# Patient Record
Sex: FEMALE | Race: WHITE | NOT HISPANIC OR LATINO | Employment: OTHER | URBAN - METROPOLITAN AREA
[De-identification: names, ages, dates, MRNs, and addresses within clinical notes are randomized per-mention and may not be internally consistent; named-entity substitution may affect disease eponyms.]

---

## 2024-06-08 ENCOUNTER — ANESTHESIA (OUTPATIENT)
Dept: OPERATING ROOM | Facility: HOSPITAL | Age: 79
End: 2024-06-08
Payer: MEDICARE

## 2024-06-08 ENCOUNTER — ANESTHESIA EVENT (OUTPATIENT)
Dept: OPERATING ROOM | Facility: HOSPITAL | Age: 79
End: 2024-06-08
Payer: MEDICARE

## 2024-06-08 PROBLEM — I61.9 INTRACEREBRAL HEMORRHAGE, NONTRAUMATIC (MULTI): Status: ACTIVE | Noted: 2024-06-08

## 2024-06-08 PROCEDURE — 2500000004 HC RX 250 GENERAL PHARMACY W/ HCPCS (ALT 636 FOR OP/ED): Performed by: PAIN MEDICINE

## 2024-06-08 PROCEDURE — 2500000005 HC RX 250 GENERAL PHARMACY W/O HCPCS

## 2024-06-08 PROCEDURE — 2500000004 HC RX 250 GENERAL PHARMACY W/ HCPCS (ALT 636 FOR OP/ED): Mod: JZ,JG

## 2024-06-08 PROCEDURE — P9045 ALBUMIN (HUMAN), 5%, 250 ML: HCPCS | Mod: JZ,JG

## 2024-06-08 RX ORDER — ROCURONIUM BROMIDE 10 MG/ML
INJECTION, SOLUTION INTRAVENOUS AS NEEDED
Status: DISCONTINUED | OUTPATIENT
Start: 2024-06-08 | End: 2024-06-08

## 2024-06-08 RX ORDER — PROPOFOL 10 MG/ML
INJECTION, EMULSION INTRAVENOUS AS NEEDED
Status: DISCONTINUED | OUTPATIENT
Start: 2024-06-08 | End: 2024-06-08

## 2024-06-08 RX ORDER — ALBUMIN HUMAN 50 G/1000ML
SOLUTION INTRAVENOUS AS NEEDED
Status: DISCONTINUED | OUTPATIENT
Start: 2024-06-08 | End: 2024-06-08

## 2024-06-08 RX ORDER — SODIUM CHLORIDE, SODIUM LACTATE, POTASSIUM CHLORIDE, CALCIUM CHLORIDE 600; 310; 30; 20 MG/100ML; MG/100ML; MG/100ML; MG/100ML
INJECTION, SOLUTION INTRAVENOUS CONTINUOUS PRN
Status: DISCONTINUED | OUTPATIENT
Start: 2024-06-08 | End: 2024-06-08

## 2024-06-08 RX ORDER — PHENYLEPHRINE HCL IN 0.9% NACL 0.4MG/10ML
SYRINGE (ML) INTRAVENOUS AS NEEDED
Status: DISCONTINUED | OUTPATIENT
Start: 2024-06-08 | End: 2024-06-08

## 2024-06-08 RX ORDER — ESMOLOL HYDROCHLORIDE 10 MG/ML
INJECTION INTRAVENOUS AS NEEDED
Status: DISCONTINUED | OUTPATIENT
Start: 2024-06-08 | End: 2024-06-08

## 2024-06-08 RX ORDER — CEFAZOLIN 1 G/1
INJECTION, POWDER, FOR SOLUTION INTRAVENOUS AS NEEDED
Status: DISCONTINUED | OUTPATIENT
Start: 2024-06-08 | End: 2024-06-08

## 2024-06-08 RX ORDER — LIDOCAINE HYDROCHLORIDE 20 MG/ML
INJECTION, SOLUTION INFILTRATION; PERINEURAL AS NEEDED
Status: DISCONTINUED | OUTPATIENT
Start: 2024-06-08 | End: 2024-06-08

## 2024-06-08 RX ORDER — GLYCOPYRROLATE 0.2 MG/ML
INJECTION INTRAMUSCULAR; INTRAVENOUS AS NEEDED
Status: DISCONTINUED | OUTPATIENT
Start: 2024-06-08 | End: 2024-06-08

## 2024-06-08 RX ORDER — PHENYLEPHRINE 10 MG/250 ML(40 MCG/ML)IN 0.9 % SOD.CHLORIDE INTRAVENOUS
CONTINUOUS PRN
Status: DISCONTINUED | OUTPATIENT
Start: 2024-06-08 | End: 2024-06-08

## 2024-06-08 RX ORDER — DEXAMETHASONE SODIUM PHOSPHATE 100 MG/10ML
INJECTION INTRAMUSCULAR; INTRAVENOUS AS NEEDED
Status: DISCONTINUED | OUTPATIENT
Start: 2024-06-08 | End: 2024-06-08

## 2024-06-08 RX ORDER — PROPOFOL 10 MG/ML
INJECTION, EMULSION INTRAVENOUS CONTINUOUS PRN
Status: DISCONTINUED | OUTPATIENT
Start: 2024-06-08 | End: 2024-06-08

## 2024-06-08 RX ORDER — FENTANYL CITRATE 50 UG/ML
INJECTION, SOLUTION INTRAMUSCULAR; INTRAVENOUS AS NEEDED
Status: DISCONTINUED | OUTPATIENT
Start: 2024-06-08 | End: 2024-06-08

## 2024-06-08 RX ADMIN — FENTANYL CITRATE 100 MCG: 50 INJECTION, SOLUTION INTRAMUSCULAR; INTRAVENOUS at 15:56

## 2024-06-08 RX ADMIN — ALBUMIN HUMAN 250 ML: 0.05 INJECTION, SOLUTION INTRAVENOUS at 17:11

## 2024-06-08 RX ADMIN — ROCURONIUM BROMIDE 50 MG: 10 INJECTION INTRAVENOUS at 15:57

## 2024-06-08 RX ADMIN — ALBUMIN HUMAN 250 ML: 0.05 INJECTION, SOLUTION INTRAVENOUS at 17:46

## 2024-06-08 RX ADMIN — ROCURONIUM BROMIDE 40 MG: 10 INJECTION INTRAVENOUS at 17:00

## 2024-06-08 RX ADMIN — VASOPRESSIN 1 UNITS: 20 INJECTION INTRAVENOUS at 17:17

## 2024-06-08 RX ADMIN — GLYCOPYRROLATE 0.1 MG: 0.2 INJECTION, SOLUTION INTRAMUSCULAR; INTRAVENOUS at 16:22

## 2024-06-08 RX ADMIN — LIDOCAINE HYDROCHLORIDE 100 MG: 20 INJECTION, SOLUTION INFILTRATION; PERINEURAL at 15:56

## 2024-06-08 RX ADMIN — PROPOFOL 100 MG: 10 INJECTION, EMULSION INTRAVENOUS at 15:56

## 2024-06-08 RX ADMIN — CEFAZOLIN 2 G: 1 INJECTION, POWDER, FOR SOLUTION INTRAMUSCULAR; INTRAVENOUS at 16:01

## 2024-06-08 RX ADMIN — ALBUMIN HUMAN 250 ML: 0.05 INJECTION, SOLUTION INTRAVENOUS at 16:58

## 2024-06-08 RX ADMIN — GLYCOPYRROLATE 0.1 MG: 0.2 INJECTION, SOLUTION INTRAMUSCULAR; INTRAVENOUS at 16:17

## 2024-06-08 RX ADMIN — Medication 120 MCG: at 16:56

## 2024-06-08 RX ADMIN — Medication 160 MCG: at 16:59

## 2024-06-08 RX ADMIN — PROPOFOL 50 MG: 10 INJECTION, EMULSION INTRAVENOUS at 16:45

## 2024-06-08 RX ADMIN — DEXAMETHASONE SODIUM PHOSPHATE 4 MG: 10 INJECTION INTRAMUSCULAR; INTRAVENOUS at 16:03

## 2024-06-08 RX ADMIN — PROPOFOL 30 MCG/KG/MIN: 10 INJECTION, EMULSION INTRAVENOUS at 17:46

## 2024-06-08 RX ADMIN — ESMOLOL HYDROCHLORIDE 40 MG: 10 INJECTION, SOLUTION INTRAVENOUS at 18:36

## 2024-06-08 RX ADMIN — VASOPRESSIN 1 UNITS: 20 INJECTION INTRAVENOUS at 17:28

## 2024-06-08 RX ADMIN — Medication 200 MCG: at 17:11

## 2024-06-08 RX ADMIN — SODIUM CHLORIDE, POTASSIUM CHLORIDE, SODIUM LACTATE AND CALCIUM CHLORIDE: 600; 310; 30; 20 INJECTION, SOLUTION INTRAVENOUS at 15:45

## 2024-06-08 RX ADMIN — Medication 0.4 MCG/KG/MIN: at 17:05

## 2024-06-08 NOTE — ANESTHESIA PREPROCEDURE EVALUATION
Patient: Brianna Sears    Procedure Information       Anesthesia Start Date/Time: 06/08/24 1546    Procedure: left hemicraniectomy with Removal Bone Flap (Left: Head)    Location: [facility] OR 25 / Virtual [facility] OR    Surgeons: Kyle Rutledge MD        H and P from Neurosurgery: History Of Present Illness  Brianna Sears is a 79 y.o. female with reported h/o COPD, CAD, found down at home, CTH L frontoparietal ICH for which nsgy consutled.     CTH personally reviewed with large left frontoparietal ICH with brain compression and midline shift  CTA head negative for vascular abnormality    Pt brought emergently to the OR for craniectomy and evacuation of intracranial bleed. She was neurologically impaired and not able to give history.    Relevant Problems   Neuro   (+) Intracerebral hemorrhage, nontraumatic (Multi)       Clinical information reviewed:    Allergies  Meds  Problems            Vitals:    06/08/24 1536   BP: 135/67   Pulse: (!) 120   Resp: 16   Temp: 36.2 °C (97.2 °F)   SpO2: 94%       No past surgical history on file.  No past medical history on file.    Prior to Admission medications    Not on File     No Known Allergies  Social History     Tobacco Use    Smoking status: Not on file    Smokeless tobacco: Not on file   Substance Use Topics    Alcohol use: Not on file         Chemistry    Lab Results   Component Value Date/Time     06/08/2024 1318     06/08/2024 1318    K 3.6 06/08/2024 1318     06/08/2024 1318    CO2 21 06/08/2024 1318    BUN 14 06/08/2024 1318    CREATININE 0.72 06/08/2024 1318    Lab Results   Component Value Date/Time    CALCIUM 9.4 06/08/2024 1318    ALKPHOS 66 06/08/2024 1318    AST 21 06/08/2024 1318    ALT 10 06/08/2024 1318    BILITOT 1.1 06/08/2024 1318          Lab Results   Component Value Date/Time    WBC 8.9 06/08/2024 1318    HGB 17.0 (H) 06/08/2024 1318    HCT 47.8 (H) 06/08/2024 1318     06/08/2024 1318     Lab  Results   Component Value Date/Time    PROTIME 11.6 06/08/2024 1318    INR 1.0 06/08/2024 1318         NPO Detail:  No data recorded     Physical Exam    Airway  Mallampati: unable to assess     Cardiovascular    Dental    Pulmonary    Abdominal      Other findings: Breath sounds clear after intubation.          Anesthesia Plan    History of general anesthesia?: unknown/emergency  History of complications of general anesthesia?: unknown/emergency    ASA 4 - emergent     general     intravenous induction

## 2024-06-08 NOTE — ANESTHESIA POSTPROCEDURE EVALUATION
Patient: Brianna Eastman Orion    Procedure Summary       Date: 06/08/24 Room / Location: [facility] OR 25 / [facility] [place]    Anesthesia Start: 1546 Anesthesia Stop: 1857    Procedure: left hemicraniectomy with Removal Bone Flap (Left: Head) Diagnosis:       Intracerebral hemorrhage, nontraumatic (Multi)      (Intracerebral hemorrhage, nontraumatic (Multi) [I61.9])    Surgeons: Kyle Rutledge MD Responsible Provider: Armand Grey MD    Anesthesia Type: general ASA Status: Not recorded            Anesthesia Type: general    Vitals Value Taken Time   /61 06/08/24 1905   Temp 36.2 06/08/24 1919   Pulse 109 06/08/24 1918   Resp 16 06/08/24 1918   SpO2 98 % 06/08/24 1918   Vitals shown include unfiled device data.    Anesthesia Post Evaluation    Patient location during evaluation: ICU  Patient participation: complete - patient cannot participate  Level of consciousness: sedated  Pain management: satisfactory to patient  Multimodal analgesia pain management approach  Airway patency: patent  Two or more strategies used to mitigate risk of obstructive sleep apnea  Cardiovascular status: acceptable  Respiratory status: ETT  Hydration status: acceptable  Postoperative Nausea and Vomiting: none      Encounter Notable Events   Notable Event Outcome Phase Comment   Hypotension Resolved in Room Intraprocedure Managed with Phenylphrine, IVF, and Vasiopressin.

## 2024-06-08 NOTE — ANESTHESIA PROCEDURE NOTES
Peripheral IV  Date/Time: 6/8/2024 3:55 PM      Placement  Needle size: 14 G  Laterality: left  Location: hand  Local anesthetic: none  Site prep: alcohol  Technique: anatomical landmarks

## 2024-06-08 NOTE — ANESTHESIA PROCEDURE NOTES
Peripheral IV  Date/Time: 6/8/2024 4:03 PM      Placement  Needle size: 16 G  Laterality: right  Location: forearm  Site prep: alcohol  Technique: anatomical landmarks

## 2024-06-08 NOTE — ANESTHESIA PROCEDURE NOTES
Airway  Date/Time: 6/8/2024 4:00 PM  Urgency: elective      Staffing  Performed: attending   Authorized by: Armand Grey MD    Performed by: Woo Vidal MD  Patient location during procedure: OR    Indications and Patient Condition  Indications for airway management: anesthesia and airway protection  Spontaneous ventilation: present  Sedation level: deep  Preoxygenated: yes  Patient position: sniffing  Mask difficulty assessment: 1 - vent by mask    Final Airway Details  Final airway type: endotracheal airway      Successful airway: ETT  Cuffed: yes   Successful intubation technique: direct laryngoscopy  Facilitating devices/methods: intubating stylet  Endotracheal tube insertion site: oral  Blade: Davis  Blade size: #3  ETT size (mm): 7.0  Cormack-Lehane Classification: grade I - full view of glottis  Placement verified by: chest auscultation and capnometry   Measured from: lips  Number of attempts at approach: 1